# Patient Record
Sex: MALE | Race: ASIAN | NOT HISPANIC OR LATINO
[De-identification: names, ages, dates, MRNs, and addresses within clinical notes are randomized per-mention and may not be internally consistent; named-entity substitution may affect disease eponyms.]

---

## 2020-08-17 PROBLEM — Z00.00 ENCOUNTER FOR PREVENTIVE HEALTH EXAMINATION: Status: ACTIVE | Noted: 2020-08-17

## 2020-08-18 ENCOUNTER — APPOINTMENT (OUTPATIENT)
Dept: ENDOCRINOLOGY | Facility: CLINIC | Age: 25
End: 2020-08-18
Payer: COMMERCIAL

## 2020-08-18 VITALS
WEIGHT: 191 LBS | DIASTOLIC BLOOD PRESSURE: 87 MMHG | HEIGHT: 71 IN | HEART RATE: 76 BPM | SYSTOLIC BLOOD PRESSURE: 123 MMHG | BODY MASS INDEX: 26.74 KG/M2

## 2020-08-18 DIAGNOSIS — R79.89 OTHER SPECIFIED ABNORMAL FINDINGS OF BLOOD CHEMISTRY: ICD-10-CM

## 2020-08-18 DIAGNOSIS — R63.5 ABNORMAL WEIGHT GAIN: ICD-10-CM

## 2020-08-18 DIAGNOSIS — Z87.891 PERSONAL HISTORY OF NICOTINE DEPENDENCE: ICD-10-CM

## 2020-08-18 PROCEDURE — 99204 OFFICE O/P NEW MOD 45 MIN: CPT

## 2020-08-18 RX ORDER — LEVOTHYROXINE SODIUM 0.03 MG/1
25 TABLET ORAL
Qty: 90 | Refills: 1 | Status: ACTIVE | COMMUNITY
Start: 2020-08-18 | End: 1900-01-01

## 2020-10-14 LAB — TSH SERPL-ACNC: 2.08 UIU/ML

## 2020-10-19 ENCOUNTER — APPOINTMENT (OUTPATIENT)
Dept: ENDOCRINOLOGY | Facility: CLINIC | Age: 25
End: 2020-10-19
Payer: COMMERCIAL

## 2020-10-19 VITALS
HEIGHT: 71 IN | HEART RATE: 80 BPM | WEIGHT: 196 LBS | DIASTOLIC BLOOD PRESSURE: 75 MMHG | BODY MASS INDEX: 27.44 KG/M2 | SYSTOLIC BLOOD PRESSURE: 129 MMHG

## 2020-10-19 DIAGNOSIS — E03.9 HYPOTHYROIDISM, UNSPECIFIED: ICD-10-CM

## 2020-10-19 PROCEDURE — 99072 ADDL SUPL MATRL&STAF TM PHE: CPT

## 2020-10-19 PROCEDURE — 99214 OFFICE O/P EST MOD 30 MIN: CPT | Mod: 25

## 2020-10-19 NOTE — DATA REVIEWED
[FreeTextEntry1] : 7/10/20: glucose 96, Cr 1.14, WBC 7.1, HgB 16.6\par \par 4/26/2019: TSH TSH 7.2, fTT 1.66, TT4 9.1 wnl, TT3 117 wnl, A1c 4.8%, ferritin 201, , 25 OH vit D 27.8, T testo 312

## 2020-10-19 NOTE — ASSESSMENT
[FreeTextEntry1] : 25yoM h/o asthma, subclinical hypothyroidism since April 2019, started on LT4 25mcg 8/19/20 with TSH being suppressed <0.1 on 9/16/20. TSH has normalized since holding LT4 since 10/1/20. \par -continue to hold LT4 25mcg qAm \par -Recheck TFTs in 1 month.

## 2020-10-19 NOTE — PHYSICAL EXAM
[Alert] : alert [Well Nourished] : well nourished [Healthy Appearance] : healthy appearance [No Acute Distress] : no acute distress [Well Developed] : well developed [Normal Voice/Communication] : normal voice communication [EOMI] : extra ocular movement intact [No Lid Lag] : no lid lag [Normal Hearing] : hearing was normal [No Respiratory Distress] : no respiratory distress [No Accessory Muscle Use] : no accessory muscle use [Normal Rate and Effort] : normal respiratory rate and effort [Clear to Auscultation] : lungs were clear to auscultation bilaterally [Normal Rate] : heart rate was normal [Regular Rhythm] : with a regular rhythm [No Edema] : no peripheral edema [Normal Bowel Sounds] : normal bowel sounds [Not Tender] : non-tender [Not Distended] : not distended [Soft] : abdomen soft [No Stigmata of Cushings Syndrome] : no stigmata of Cushings Syndrome [Normal Gait] : normal gait [Normal Strength/Tone] : muscle strength and tone were normal [No Rash] : no rash [No Tremors] : no tremors [Normal Affect] : the affect was normal [Normal Mood] : the mood was normal [No Neck Mass] : no neck mass was observed [Thyroid Not Enlarged] : the thyroid was not enlarged [de-identified] : young  man [Acanthosis Nigricans] : no acanthosis nigricans [de-identified] : hypoactive reflexes

## 2020-10-19 NOTE — HISTORY OF PRESENT ILLNESS
[FreeTextEntry1] : 25yoM h/o asthma, subclinical hypothyroidism since April 2019, started on LT4 25mcg 8/19/20. \par \par Thyroid history: \par -family history of thyroid disease: denies\par -No history of irradiation to the neck. No history of amiodarone or lithium use. No recent iodinated contrast injection. \par -In 2019, TSH was 7.2 with normal T4, T3 at Allergist's office. Pt tried to exercise in hopes of it getting better.\par -7/10/20: TSH 4.87, fT4 1.5. We started LT4 25mcg daily 8/19/20. When checked on 9/16/20 at Allergist's office, TSH undetectable and he was advised to stop LT4. He stopped LT4 around October 1, 2020. \par -10/13/20: TSH was 2.08 \par \par #Allergies: getting shots. Skin is drier. \par \par Meds: none \par SH: former smoker\par FH: Grandmother with T2DM\par \par ROS: Denies chest pain, palpitations, n/v/d/c. Review of constitutional, eyes, ENT, cardiovascular, respiratory, gastrointestinal, genitourinary, musculoskeletal, integumentary, neurological, psychiatric, endocrine and heme/lymph systems is otherwise negative.

## 2020-11-18 ENCOUNTER — APPOINTMENT (OUTPATIENT)
Dept: ENDOCRINOLOGY | Facility: CLINIC | Age: 25
End: 2020-11-18

## 2021-06-05 ENCOUNTER — EMERGENCY (EMERGENCY)
Facility: HOSPITAL | Age: 26
LOS: 1 days | Discharge: HOME | End: 2021-06-05
Attending: EMERGENCY MEDICINE
Payer: COMMERCIAL

## 2021-06-05 VITALS
WEIGHT: 205.25 LBS | OXYGEN SATURATION: 98 % | SYSTOLIC BLOOD PRESSURE: 120 MMHG | HEIGHT: 71 IN | TEMPERATURE: 98 F | HEART RATE: 72 BPM | RESPIRATION RATE: 18 BRPM | DIASTOLIC BLOOD PRESSURE: 82 MMHG

## 2021-06-05 PROCEDURE — 99282 EMERGENCY DEPT VISIT SF MDM: CPT

## 2021-06-05 NOTE — ED PROVIDER NOTE - CLINICAL SUMMARY MEDICAL DECISION MAKING FREE TEXT BOX
26 year old male with small area of swelling above right clavicle. vitals WNL. PE as above.  PE consistent with enlarged lymph node likely 2/2 vaccine. will dc. f/u with PMD. return precautions discussed.

## 2021-06-05 NOTE — ED PROVIDER NOTE - OBJECTIVE STATEMENT
26 year old male denies PMH coming in with small area of swelling above right clavicle that he noticed earlier today. pt states that he had his second covid vaccine on 5/31. denies all other complaints. states initially more swollen but now swelling improving.

## 2021-06-05 NOTE — ED PROVIDER NOTE - NSFOLLOWUPINSTRUCTIONS_ED_ALL_ED_FT
Log Out.      [employer] CareNotes®     :  [facility]  	                       LYMPHADENOPATHY - AfterCare(R) Instructions(ER/ED)           Lymphadenopathy    WHAT YOU NEED TO KNOW:    Lymphadenopathy is swelling of your lymph nodes. Lymph nodes are small organs that are part of your immune system. The lymph nodes are found throughout your body. They are most easily felt in your neck, under your arms, and near your groin. Lymphadenopathy can occur in one or more areas of your body. It is usually caused by an infection.    DISCHARGE INSTRUCTIONS:    Return to the emergency department if:   •The swollen lymph nodes bleed.      •You have swollen lymph nodes in your neck that affect your breathing or swallowing.      Contact your healthcare provider if:   •You have a fever.      •You have a new swollen and painful lymph node.      •You have a skin rash.      •Your lymph node remains swollen or painful, or it gets bigger.      •Your lymph node has red streaks around it, or the skin around the lymph node is red.      •You have questions or concerns about your condition or care.      Follow up with your healthcare provider as directed: Write down your questions so you remember to ask them during your visits.     Self-care:   •Do not poke or squeeze the swollen lymph nodes.      •Apply heat to the swollen glands. You may use warm compresses, or an electric heating pad set on low.       •Rest as needed. If you have a fever, rest until your temperature returns to normal. Return to your normal daily activities slowly after your fever is gone.          © Copyright [employer] 2021           back to top                          © Copyright [employer] 2021

## 2021-06-05 NOTE — ED PROVIDER NOTE - PATIENT PORTAL LINK FT
You can access the FollowMyHealth Patient Portal offered by [facility] by registering at the following website: [email or web address]. By joining [email or web address]’s FollowMyHealth portal, you will also be able to view your health information using other applications (apps) compatible with our system.

## 2021-06-05 NOTE — ED PROVIDER NOTE - PHYSICAL EXAMINATION
proximal to right clavicle with small round mildly tender 5mm mobile mass.   no other lymphadenopathy observed.

## 2021-06-05 NOTE — ED ADULT TRIAGE NOTE - CHIEF COMPLAINT QUOTE
Pt stated he had his 2nd covid vaccine on Jan 2, 2021, noticed yesterday red spot and soft mass near his right collar bone painful when he moves his right arm. Pt stated he had his 2nd covid vaccine on May 31,, 2021, noticed yesterday red spot and soft mass near his right collar bone painful when he moves his right arm.

## 2023-05-22 ENCOUNTER — EMERGENCY (EMERGENCY)
Facility: HOSPITAL | Age: 28
LOS: 1 days | Discharge: HOME | End: 2023-05-22
Attending: STUDENT IN AN ORGANIZED HEALTH CARE EDUCATION/TRAINING PROGRAM
Payer: COMMERCIAL

## 2023-05-22 VITALS
HEART RATE: 89 BPM | HEIGHT: 71 IN | RESPIRATION RATE: 18 BRPM | SYSTOLIC BLOOD PRESSURE: 138 MMHG | WEIGHT: 207.9 LBS | DIASTOLIC BLOOD PRESSURE: 83 MMHG | TEMPERATURE: 98 F | OXYGEN SATURATION: 100 %

## 2023-05-22 PROCEDURE — 99284 EMERGENCY DEPT VISIT MOD MDM: CPT | Mod: 25

## 2023-05-22 PROCEDURE — 90471 IMMUNIZATION ADMIN: CPT

## 2023-05-22 PROCEDURE — 90715 TDAP VACCINE 7 YRS/> IM: CPT

## 2023-05-22 PROCEDURE — 12001 RPR S/N/AX/GEN/TRNK 2.5CM/<: CPT

## 2023-05-22 PROCEDURE — 99283 EMERGENCY DEPT VISIT LOW MDM: CPT | Mod: 25

## 2023-05-22 RX ORDER — LIDOCAINE HCL 20 MG/ML
5 VIAL (ML) INJECTION ONCE
Refills: 0 | Status: COMPLETED | OUTPATIENT
Start: 2023-05-22 | End: 2023-05-22

## 2023-05-22 RX ORDER — TETANUS TOXOID, REDUCED DIPHTHERIA TOXOID AND ACELLULAR PERTUSSIS VACCINE, ADSORBED 5; 2.5; 8; 8; 2.5 [IU]/.5ML; [IU]/.5ML; UG/.5ML; UG/.5ML; UG/.5ML
0.5 SUSPENSION INTRAMUSCULAR ONCE
Refills: 0 | Status: COMPLETED | OUTPATIENT
Start: 2023-05-22 | End: 2023-05-22

## 2023-05-22 RX ADMIN — TETANUS TOXOID, REDUCED DIPHTHERIA TOXOID AND ACELLULAR PERTUSSIS VACCINE, ADSORBED 0.5 MILLILITER(S): 5; 2.5; 8; 8; 2.5 SUSPENSION INTRAMUSCULAR at 23:31

## 2023-05-22 RX ADMIN — Medication 5 MILLILITER(S): at 23:44

## 2023-05-22 NOTE — ED PROVIDER NOTE - OBJECTIVE STATEMENT
29 y/o presenting with left thumb laceration today. patient denies other injury, numbness, weakness. state he was reaching in a box when something him

## 2023-05-22 NOTE — ED PROVIDER NOTE - NSFOLLOWUPINSTRUCTIONS_ED_ALL_ED_FT
Please return in 10-14 days for suture removal.     Laceration    WHAT YOU NEED TO KNOW:    What is a laceration? A laceration is an injury to the skin and the soft tissue underneath it. Lacerations can happen anywhere on the body.    What are the signs and symptoms of a laceration? Lacerations can be many shapes and sizes. The open skin may look like a cut, tear, or gash. The wound may hurt, bleed, bruise, or swell. Lacerations in certain areas of the body, such as the scalp, may bleed a lot. Your wound may have edges that are close together or wide apart. You may have numbness around the wound. You may have decreased movement in an area below the wound.    How is a laceration diagnosed? Tell your healthcare provider about how you got your laceration. He or she will examine your laceration and decide what treatment you need. An x-ray, ultrasound, CT, or MRI may show foreign objects in the wound. Foreign objects include metal, gravel, and glass. The tests may also show damage to deeper tissues. You may be given contrast liquid to help the injured area show up better in the pictures. Tell the healthcare provider if you have ever had an allergic reaction to contrast liquid. Do not enter the MRI room with anything metal. Metal can cause serious injury. Tell the healthcare provider if you have any metal in or on your body.    How will my laceration be treated? The treatment you will need depends on how large and deep the laceration is, and where it is located. It also depends on whether you have damage to deeper tissues. You may need any of the following:    Pressure may be applied to stop any bleeding.    Wound cleaning may be needed to remove dirt or debris. This will decrease the chance of infection. Your healthcare provider may need to look in your laceration for foreign objects. He or she may give you medicine to numb the area and decrease pain. He or she may also give you medicine to help you relax.    Wound closure with stitches, staples, tissue glue, or medical strips may be needed. Your healthcare provider may need to give you medicine to numb the area and decrease pain. He or she may also give you medicine to help you relax. These may help the wound heal and prevent infection. Stitches may decrease the amount of scarring you have. Some lacerations may heal better without stitches.        Medicine to treat pain or prevent infection may be given. You may also be given a tetanus shot. Your healthcare provider will decide if you need a tetanus shot. Wounds at high risk for tetanus infection include wounds with dirt or saliva in them. You should get a tetanus shot within 72 hours of getting a laceration or wound. Tell your healthcare provider if you have had the tetanus vaccine or a booster within the last 5 years.    Surgery may be needed if your laceration needs a lot of cleaning or removal of foreign objects.  When should I seek immediate care?    You have heavy bleeding or bleeding that does not stop after 10 minutes of holding firm, direct pressure over the wound.    Your stitches come apart.  When should I call my doctor?    You have a fever or chills.    Your laceration is red, warm, or swollen.    You have red streaks on your skin coming from your wound.    You have white or yellow drainage from the wound that smells bad.    You have pain that gets worse, even after treatment.    You have questions or concerns about your condition or care.  CARE AGREEMENT:    You have the right to help plan your care. Learn about your health condition and how it may be treated. Discuss treatment options with your healthcare providers to decide what care you want to receive. You always have the right to refuse treatment.

## 2023-05-22 NOTE — ED ADULT NURSE NOTE - NSFALLUNIVINTERV_ED_ALL_ED
Bed/Stretcher in lowest position, wheels locked, appropriate side rails in place/Call bell, personal items and telephone in reach/Instruct patient to call for assistance before getting out of bed/chair/stretcher/Non-slip footwear applied when patient is off stretcher/[place] to call system/Physically safe environment - no spills, clutter or unnecessary equipment/Purposeful proactive rounding/Room/bathroom lighting operational, light cord in reach

## 2023-05-22 NOTE — ED ADULT TRIAGE NOTE - CHIEF COMPLAINT QUOTE
Pt stated he accidentally cut his left thumb on a metal object in  his trunk approximately 9pm -1opm tonight, dressing in place.

## 2023-08-08 NOTE — ED PROVIDER NOTE - PATIENT PORTAL LINK FT
Sound like BG readings are improving but still above goal   He is interested in personal CGM       -- Medication Changes:   Start with Mounjaro 2.5 mg weekly for 4 weeks and then we can increase to 5 mg weekly   ONCE A WEEK IN THE ABDOMEN     Goal is to work our way off the Metformin so you can just take something once a week     Please notify me for any abdominal pain, nausea, vomiting, diarrhea, acid reflux, constipation     We can see if you can get the dexcom  g7 to monitor your sugars         -- Reviewed goals of therapy are to get the best control we can without hypoglycemia.  -- Advised frequent self blood glucose monitoring.  Patient encouraged to document glucose results and bring them to every clinic visit.  dexcom G7 sample given in clinic today - RX for dexcom sent to pharmacy   -- Hypoglycemia precautions discussed. Instructed on precautions before driving.    -- Call for Bg repeatedly < 70 or > 180.   -- Close adherence to lifestyle changes recommended.   -- Periodic follow ups for eye evaluations, foot care and dental care suggested.  -- Refer to diabetes education-- new onset T2DM, diet, comprehensive review        You can access the FollowMyHealth Patient Portal offered by [facility] by registering at the following website: [email or web address]. By joining [employer]’s FollowMyHealth portal, you will also be able to view your health information using other applications (apps) compatible with our system.

## 2025-02-13 NOTE — ED ADULT NURSE NOTE - NSFALLRISK_ED_ALL_ED
Patient Information    Lab -- Please go to the lab now to have your labwork completed. Your doctor's office will notify you of your results once reviewed.  Radiology -- Please go to X-ray now to have your test performed.     Follow Up  -- Follow up with your regular Primary Care Provider as scheduled.     Additional Educational Resources:  For additional resources regarding your symptoms, diagnosis, or further health information, please visit the Health Resources section on [employer] or the Online Health Resources section in [employer].    PODIATRY [phone]    PULMONARY [phone]    
No